# Patient Record
Sex: FEMALE | Race: WHITE | HISPANIC OR LATINO
[De-identification: names, ages, dates, MRNs, and addresses within clinical notes are randomized per-mention and may not be internally consistent; named-entity substitution may affect disease eponyms.]

---

## 2018-05-02 PROBLEM — Z00.00 ENCOUNTER FOR PREVENTIVE HEALTH EXAMINATION: Status: ACTIVE | Noted: 2018-05-02

## 2018-06-01 ENCOUNTER — APPOINTMENT (OUTPATIENT)
Dept: CARDIOLOGY | Facility: CLINIC | Age: 43
End: 2018-06-01

## 2018-06-01 VITALS
DIASTOLIC BLOOD PRESSURE: 70 MMHG | WEIGHT: 134 LBS | HEIGHT: 60 IN | SYSTOLIC BLOOD PRESSURE: 108 MMHG | BODY MASS INDEX: 26.31 KG/M2 | HEART RATE: 72 BPM

## 2018-06-01 DIAGNOSIS — Z82.49 FAMILY HISTORY OF ISCHEMIC HEART DISEASE AND OTHER DISEASES OF THE CIRCULATORY SYSTEM: ICD-10-CM

## 2018-06-01 RX ORDER — CLONAZEPAM 1 MG/1
1 TABLET ORAL
Refills: 0 | Status: ACTIVE | COMMUNITY

## 2018-06-29 ENCOUNTER — APPOINTMENT (OUTPATIENT)
Dept: CARDIOLOGY | Facility: CLINIC | Age: 43
End: 2018-06-29

## 2018-07-20 ENCOUNTER — APPOINTMENT (OUTPATIENT)
Dept: CARDIOLOGY | Facility: CLINIC | Age: 43
End: 2018-07-20

## 2018-07-20 VITALS
WEIGHT: 138 LBS | SYSTOLIC BLOOD PRESSURE: 128 MMHG | OXYGEN SATURATION: 99 % | HEIGHT: 60 IN | BODY MASS INDEX: 27.09 KG/M2 | DIASTOLIC BLOOD PRESSURE: 76 MMHG | HEART RATE: 71 BPM

## 2018-07-20 DIAGNOSIS — E78.5 HYPERLIPIDEMIA, UNSPECIFIED: ICD-10-CM

## 2018-07-20 DIAGNOSIS — R07.9 CHEST PAIN, UNSPECIFIED: ICD-10-CM

## 2018-07-25 ENCOUNTER — APPOINTMENT (OUTPATIENT)
Dept: OBGYN | Facility: CLINIC | Age: 43
End: 2018-07-25

## 2018-07-25 VITALS
HEIGHT: 59 IN | WEIGHT: 128 LBS | BODY MASS INDEX: 25.8 KG/M2 | SYSTOLIC BLOOD PRESSURE: 118 MMHG | DIASTOLIC BLOOD PRESSURE: 70 MMHG

## 2018-08-06 ENCOUNTER — OUTPATIENT (OUTPATIENT)
Dept: OUTPATIENT SERVICES | Facility: HOSPITAL | Age: 43
LOS: 1 days | Discharge: HOME | End: 2018-08-06

## 2018-08-06 DIAGNOSIS — E78.5 HYPERLIPIDEMIA, UNSPECIFIED: ICD-10-CM

## 2018-08-22 ENCOUNTER — APPOINTMENT (OUTPATIENT)
Dept: OBGYN | Facility: CLINIC | Age: 43
End: 2018-08-22

## 2018-09-12 ENCOUNTER — RECORD ABSTRACTING (OUTPATIENT)
Age: 43
End: 2018-09-12

## 2018-09-12 DIAGNOSIS — Z98.890 OTHER SPECIFIED POSTPROCEDURAL STATES: ICD-10-CM

## 2018-09-12 DIAGNOSIS — Z80.49 FAMILY HISTORY OF MALIGNANT NEOPLASM OF OTHER GENITAL ORGANS: ICD-10-CM

## 2018-09-12 DIAGNOSIS — Z78.9 OTHER SPECIFIED HEALTH STATUS: ICD-10-CM

## 2018-09-12 DIAGNOSIS — Z86.59 PERSONAL HISTORY OF OTHER MENTAL AND BEHAVIORAL DISORDERS: ICD-10-CM

## 2018-09-12 DIAGNOSIS — Z87.19 PERSONAL HISTORY OF OTHER DISEASES OF THE DIGESTIVE SYSTEM: ICD-10-CM

## 2018-09-17 ENCOUNTER — APPOINTMENT (OUTPATIENT)
Dept: OBGYN | Facility: CLINIC | Age: 43
End: 2018-09-17

## 2018-09-21 ENCOUNTER — APPOINTMENT (OUTPATIENT)
Dept: CARDIOLOGY | Facility: CLINIC | Age: 43
End: 2018-09-21

## 2019-01-29 ENCOUNTER — APPOINTMENT (OUTPATIENT)
Dept: OBGYN | Facility: CLINIC | Age: 44
End: 2019-01-29
Payer: MEDICAID

## 2019-01-29 PROCEDURE — 99396 PREV VISIT EST AGE 40-64: CPT

## 2019-02-26 ENCOUNTER — APPOINTMENT (OUTPATIENT)
Dept: OBGYN | Facility: CLINIC | Age: 44
End: 2019-02-26
Payer: MEDICAID

## 2019-02-26 PROCEDURE — 57454 BX/CURETT OF CERVIX W/SCOPE: CPT

## 2019-03-12 ENCOUNTER — APPOINTMENT (OUTPATIENT)
Dept: OBGYN | Facility: CLINIC | Age: 44
End: 2019-03-12
Payer: MEDICAID

## 2019-03-12 PROCEDURE — 99212 OFFICE O/P EST SF 10 MIN: CPT

## 2019-03-28 ENCOUNTER — APPOINTMENT (OUTPATIENT)
Dept: OBGYN | Facility: CLINIC | Age: 44
End: 2019-03-28
Payer: MEDICAID

## 2019-03-28 PROCEDURE — 99213 OFFICE O/P EST LOW 20 MIN: CPT

## 2019-05-14 ENCOUNTER — APPOINTMENT (OUTPATIENT)
Dept: OBGYN | Facility: CLINIC | Age: 44
End: 2019-05-14
Payer: MEDICAID

## 2019-05-14 PROCEDURE — 99213 OFFICE O/P EST LOW 20 MIN: CPT

## 2019-09-10 ENCOUNTER — APPOINTMENT (OUTPATIENT)
Dept: OBGYN | Facility: CLINIC | Age: 44
End: 2019-09-10
Payer: MEDICAID

## 2019-09-10 PROCEDURE — 99213 OFFICE O/P EST LOW 20 MIN: CPT

## 2019-09-10 PROCEDURE — 87490 CHLMYD TRACH DNA DIR PROBE: CPT

## 2019-09-11 ENCOUNTER — APPOINTMENT (OUTPATIENT)
Dept: OBGYN | Facility: CLINIC | Age: 44
End: 2019-09-11
Payer: MEDICAID

## 2019-09-11 PROCEDURE — 93975 VASCULAR STUDY: CPT

## 2019-09-11 PROCEDURE — 76830 TRANSVAGINAL US NON-OB: CPT

## 2019-11-19 ENCOUNTER — APPOINTMENT (OUTPATIENT)
Dept: OBGYN | Facility: CLINIC | Age: 44
End: 2019-11-19
Payer: MEDICAID

## 2019-11-19 PROCEDURE — 93975 VASCULAR STUDY: CPT

## 2019-11-19 PROCEDURE — 76830 TRANSVAGINAL US NON-OB: CPT

## 2020-03-24 ENCOUNTER — APPOINTMENT (OUTPATIENT)
Dept: OBGYN | Facility: CLINIC | Age: 45
End: 2020-03-24

## 2020-06-16 ENCOUNTER — APPOINTMENT (OUTPATIENT)
Dept: OBGYN | Facility: CLINIC | Age: 45
End: 2020-06-16
Payer: MEDICAID

## 2020-06-16 PROCEDURE — 87490 CHLMYD TRACH DNA DIR PROBE: CPT

## 2020-06-16 PROCEDURE — 99396 PREV VISIT EST AGE 40-64: CPT

## 2020-06-23 ENCOUNTER — APPOINTMENT (OUTPATIENT)
Dept: OBGYN | Facility: CLINIC | Age: 45
End: 2020-06-23
Payer: MEDICAID

## 2020-06-23 PROCEDURE — 76830 TRANSVAGINAL US NON-OB: CPT

## 2020-07-29 ENCOUNTER — APPOINTMENT (OUTPATIENT)
Dept: UROGYNECOLOGY | Facility: CLINIC | Age: 45
End: 2020-07-29

## 2020-08-11 ENCOUNTER — APPOINTMENT (OUTPATIENT)
Dept: OBGYN | Facility: CLINIC | Age: 45
End: 2020-08-11
Payer: MEDICAID

## 2020-08-11 PROCEDURE — 93975 VASCULAR STUDY: CPT

## 2020-08-11 PROCEDURE — 76830 TRANSVAGINAL US NON-OB: CPT

## 2021-01-12 ENCOUNTER — APPOINTMENT (OUTPATIENT)
Dept: OBGYN | Facility: CLINIC | Age: 46
End: 2021-01-12
Payer: MEDICAID

## 2021-01-12 PROCEDURE — 99072 ADDL SUPL MATRL&STAF TM PHE: CPT

## 2021-01-12 PROCEDURE — 99213 OFFICE O/P EST LOW 20 MIN: CPT

## 2021-01-19 ENCOUNTER — APPOINTMENT (OUTPATIENT)
Dept: OBGYN | Facility: CLINIC | Age: 46
End: 2021-01-19
Payer: MEDICAID

## 2021-01-19 PROCEDURE — 99072 ADDL SUPL MATRL&STAF TM PHE: CPT

## 2021-01-19 PROCEDURE — 99213 OFFICE O/P EST LOW 20 MIN: CPT

## 2021-02-02 ENCOUNTER — APPOINTMENT (OUTPATIENT)
Dept: OBGYN | Facility: CLINIC | Age: 46
End: 2021-02-02

## 2021-07-20 ENCOUNTER — NON-APPOINTMENT (OUTPATIENT)
Age: 46
End: 2021-07-20

## 2021-07-20 ENCOUNTER — APPOINTMENT (OUTPATIENT)
Dept: OBGYN | Facility: CLINIC | Age: 46
End: 2021-07-20
Payer: MEDICAID

## 2021-07-20 PROCEDURE — 87490 CHLMYD TRACH DNA DIR PROBE: CPT

## 2021-07-20 PROCEDURE — 99396 PREV VISIT EST AGE 40-64: CPT

## 2021-08-05 ENCOUNTER — APPOINTMENT (OUTPATIENT)
Dept: OBGYN | Facility: CLINIC | Age: 46
End: 2021-08-05
Payer: MEDICAID

## 2021-08-05 PROCEDURE — 99212 OFFICE O/P EST SF 10 MIN: CPT

## 2021-08-05 PROCEDURE — 76830 TRANSVAGINAL US NON-OB: CPT

## 2022-03-01 ENCOUNTER — APPOINTMENT (OUTPATIENT)
Dept: OBGYN | Facility: CLINIC | Age: 47
End: 2022-03-01
Payer: MEDICAID

## 2022-03-01 PROCEDURE — 99215 OFFICE O/P EST HI 40 MIN: CPT

## 2022-08-08 ENCOUNTER — APPOINTMENT (OUTPATIENT)
Dept: OBGYN | Facility: CLINIC | Age: 47
End: 2022-08-08

## 2022-08-08 ENCOUNTER — NON-APPOINTMENT (OUTPATIENT)
Age: 47
End: 2022-08-08

## 2022-08-08 PROCEDURE — 87490 CHLMYD TRACH DNA DIR PROBE: CPT

## 2022-08-08 PROCEDURE — 99396 PREV VISIT EST AGE 40-64: CPT

## 2022-08-09 ENCOUNTER — TRANSCRIPTION ENCOUNTER (OUTPATIENT)
Age: 47
End: 2022-08-09

## 2022-12-27 ENCOUNTER — APPOINTMENT (OUTPATIENT)
Dept: OBGYN | Facility: CLINIC | Age: 47
End: 2022-12-27

## 2022-12-27 DIAGNOSIS — N89.8 OTHER SPECIFIED NONINFLAMMATORY DISORDERS OF VAGINA: ICD-10-CM

## 2022-12-27 PROCEDURE — 99213 OFFICE O/P EST LOW 20 MIN: CPT

## 2022-12-27 RX ORDER — TERCONAZOLE 8 MG/G
0.8 CREAM VAGINAL
Qty: 20 | Refills: 1 | Status: ACTIVE | COMMUNITY
Start: 2022-12-27 | End: 1900-01-01

## 2022-12-27 NOTE — HISTORY OF PRESENT ILLNESS
[FreeTextEntry1] : -- 48 Y/O HERE C/O VAGINAL ITCHING;-[facility].\par PMHX;/ anxiety, depression, appendectomy\par SOCIAL HX;-ETOH -CIGG\par STD; HPV / DISCUSSED CONDOMS\par FAMILY HISTORY OF BREAST CANCER\par REVIEW OF SYMPTOMS DONE;\par ALLERGIES; pt answered NKDA\par Medication reconciliation was completed by reviewing, with the patient's\par involvement, the patient's current outpatient medications and those\par ordered for the patient today.[facility] depression & anxiety\par \par PE;\par ABDOMEN;SOFT NT ND\par NL GENITALIA \par VAGINA -DC THICK WHITE\par CERVIX;-CMT\par UTERUS;NL SIZE NT AV\par ADNEXA; NT - MASSES\par \par A;P;VAGINTIS\par -BD AFFIRM\par -TERAZOL 7\par -F-U PRN.\par \par -F-U PRN.

## 2023-01-03 RX ORDER — FLUCONAZOLE 150 MG/1
150 TABLET ORAL
Qty: 1 | Refills: 1 | Status: ACTIVE | COMMUNITY
Start: 2023-01-03 | End: 1900-01-01

## 2023-04-27 ENCOUNTER — APPOINTMENT (OUTPATIENT)
Dept: ORTHOPEDIC SURGERY | Facility: CLINIC | Age: 48
End: 2023-04-27
Payer: MEDICAID

## 2023-04-27 PROCEDURE — 72040 X-RAY EXAM NECK SPINE 2-3 VW: CPT

## 2023-04-27 PROCEDURE — 99213 OFFICE O/P EST LOW 20 MIN: CPT

## 2023-04-27 PROCEDURE — 73070 X-RAY EXAM OF ELBOW: CPT | Mod: RT

## 2023-04-27 NOTE — ASSESSMENT
[FreeTextEntry1] :  will take ibuprofen 1 a day heat light exercise stretching we deferred on therapy no reason for an MRI I will see her in a few months going to continue to work 0

## 2023-04-27 NOTE — HISTORY OF PRESENT ILLNESS
[de-identified] : History of Present Illness\par Ms. Wen Dumont, a 46-year-old female, presents today for evaluation pain to the lower back more to the right and\par left.\par Patient denies any trauma or any injury.\par Patient works as a home health aid.\par Patient does admits to doing exercise routinely, she does a lot of walking and Zoey. \par Patient describes the pain as achy and at times when she does certain movements she has sharp acute pain.\par Patient denies any radiation of pain to the lower extremities.

## 2023-04-27 NOTE — REASON FOR VISIT
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

## 2023-04-27 NOTE — IMAGING
[de-identified] :  pleasant easy to examine in no distress neck mild limits in motion with spasm no pain on compression some trapezius spasm both shoulders full motion of the weakness right upper arm\par \par Right elbow tenderness laterally worse with resisted supination extension at the wrist\par \par Cervical x-ray mild C5-6 changes\par \par Right elbow x-ray unremarkable

## 2023-05-08 ENCOUNTER — APPOINTMENT (OUTPATIENT)
Dept: OBGYN | Facility: CLINIC | Age: 48
End: 2023-05-08
Payer: MEDICAID

## 2023-05-08 PROCEDURE — 76830 TRANSVAGINAL US NON-OB: CPT

## 2023-05-08 PROCEDURE — 99213 OFFICE O/P EST LOW 20 MIN: CPT | Mod: 25

## 2023-05-08 PROCEDURE — 93976 VASCULAR STUDY: CPT

## 2023-05-08 NOTE — HISTORY OF PRESENT ILLNESS
[FreeTextEntry1] : -- 46 Y/O LMP 12/2022 AND THEN 4/17/23 AND STILL SPOTTING AND THEN STARTED TO BLEED HEAVIER.\par PMHX;/ anxiety, depression, appendectomy\par SOCIAL HX;-ETOH -CIGG\par STD; HPV / DISCUSSED CONDOMS\par FAMILY HISTORY OF BREAST CANCER\par REVIEW OF SYMPTOMS DONE;\par ALLERGIES; pt answered NKDA\par Medication reconciliation was completed by reviewing, with the patient's\par involvement, the patient's current outpatient medications and those\par ordered for the patient today.[facility] depression & anxiety\par \par PE;\par ABDOMEN;SOFT NT ND\par EXT -CCE\par \par A;P;IRREG BLEEDING\par -SONO REVIEWED\par -HORMONAL PANEL\par -HYSTEROSCOPY APPT\par -F-U AFTER ABOVE.\par \par

## 2023-05-09 ENCOUNTER — APPOINTMENT (OUTPATIENT)
Dept: OBGYN | Facility: CLINIC | Age: 48
End: 2023-05-09
Payer: MEDICAID

## 2023-05-09 LAB
ESTRADIOL SERPL-MCNC: 7 PG/ML
FSH SERPL-MCNC: 20.5 IU/L
HCG SERPL-MCNC: <1 MIU/ML
LH SERPL-ACNC: 12.9 IU/L
PROGEST SERPL-MCNC: 0.1 NG/ML
PROLACTIN SERPL-MCNC: 8.6 NG/ML
TESTOST SERPL-MCNC: <2.5 NG/DL
TSH SERPL-ACNC: 1.55 UIU/ML

## 2023-05-09 PROCEDURE — 58558Z: CUSTOM

## 2023-05-09 PROCEDURE — 99213 OFFICE O/P EST LOW 20 MIN: CPT | Mod: 25

## 2023-05-09 NOTE — HISTORY OF PRESENT ILLNESS
[FreeTextEntry1] : -- 48 Y/O LMP 12/2022 AND THEN 4/17/23 AND STILL SPOTTING AND THEN STARTED TO BLEED HEAVIER.PT C/O IRREG VB.PT HERE FOR HYSTEROSCOPY;INFORMED CONSENT OBTAINED RBA DISCUSSED.\par PMHX;/ anxiety, depression, appendectomy\par SOCIAL HX;-ETOH -CIGG\par STD; HPV / DISCUSSED CONDOMS\par FAMILY HISTORY OF BREAST CANCER\par REVIEW OF SYMPTOMS DONE;\par ALLERGIES; pt answered NKDA\par Medication reconciliation was completed by reviewing, with the patient's\par involvement, the patient's current outpatient medications and those\par ordered for the patient today.[facility] depression & anxiety\par \par PE;\par ABDOMEN;SOFT NT ND\par NL GENITALIA\par VAGINA-DC\par MIN BLOOD\par CX-CMT\par UTERUS NL SIZE NT\par ADNEXA NT - MASSES\par EXT -CCE\par \par -TIME OUT DONE.  EBL LESS THAN 5CC\par -IN OFFICE HYSTEROSCOPY DONE WITHOUT INCIDENT; CX DIALTED ,UTERUS SOUNDED TO 7,\par -BOTH OSTIA SEEN; NL APPERING ENDOMETRIAL CAVITY;- MASSES - POLYPS\par -ENDO BX DONE WITHOUT INCIDENT\par -I/O MONITORED THROUGHOUT THE PROCEDURE\par -PT TOLERATED THE PROCEDURE WELL\par -INSTRUCTIONS REVIEWED\par -RTC 2 WEEKS.\par \par \par

## 2023-05-11 LAB — CORE LAB BIOPSY: NORMAL

## 2023-05-22 ENCOUNTER — APPOINTMENT (OUTPATIENT)
Dept: OBGYN | Facility: CLINIC | Age: 48
End: 2023-05-22
Payer: MEDICARE

## 2023-05-22 DIAGNOSIS — N84.0 POLYP OF CORPUS UTERI: ICD-10-CM

## 2023-05-22 DIAGNOSIS — N92.6 IRREGULAR MENSTRUATION, UNSPECIFIED: ICD-10-CM

## 2023-05-22 PROCEDURE — 99213 OFFICE O/P EST LOW 20 MIN: CPT

## 2023-05-22 NOTE — HISTORY OF PRESENT ILLNESS
[FreeTextEntry1] : -- 48 Y/O LMP 12/2022 AND THEN 4/17/23 HERE FOR F-U AFTER HYSTEROSCOPY;PATH REVIEWED;POLYP;PT C/O IRREG BLEEDING.\par PMHX;/ anxiety, depression, appendectomy\par SOCIAL HX;-ETOH -CIGG\par STD; HPV / DISCUSSED CONDOMS\par FAMILY HISTORY OF BREAST CANCER\par REVIEW OF SYMPTOMS DONE;\par ALLERGIES; pt answered NKDA\par Medication reconciliation was completed by reviewing, with the patient's\par involvement, the patient's current outpatient medications and those\par ordered for the patient today.[facility] depression & anxiety\par \par PE;\par ABDOMEN;SOFT NT ND\par EXT -CCE\par \par A;P;IRREG VB S/P HYSTEROSCOPY;POLYP\par -PATH REVIEWED\par -ANSWERED QUESTIONS\par -DISCUSSED POSSIBLE POLYP REMOVAL IN OPERTAING ROOM\par -ANSWERED QUESTIONS.

## 2023-05-30 ENCOUNTER — RX RENEWAL (OUTPATIENT)
Age: 48
End: 2023-05-30

## 2023-06-01 ENCOUNTER — APPOINTMENT (OUTPATIENT)
Dept: ORTHOPEDIC SURGERY | Facility: CLINIC | Age: 48
End: 2023-06-01
Payer: MEDICAID

## 2023-06-01 PROCEDURE — 99213 OFFICE O/P EST LOW 20 MIN: CPT

## 2023-06-01 NOTE — REASON FOR VISIT
[FreeTextEntry2] : back and neck pain Pain is almost gone as is the pain in her elbow stop taking the ibuprofen exercising lost a couple lb is working full duty

## 2023-06-01 NOTE — IMAGING
[de-identified] :  pleasant easy to examine in no distress neck mild limits in motion with spasm no pain on compression some trapezius spasm both shoulders full motion of the weakness right upper arm\par \par Right elbow tenderness laterally worse with resisted supination extension at the wrist\par \par Cervical x-ray mild C5-6 changes\par \par Right elbow x-ray unremarkable

## 2023-06-01 NOTE — HISTORY OF PRESENT ILLNESS
[de-identified] : History of Present Illness\par Ms. Wen Dumont, a 46-year-old female, presents today for evaluation pain to the lower back more to the right and\par left.\par Patient denies any trauma or any injury.\par Patient works as a home health aid.\par Patient does admits to doing exercise routinely, she does a lot of walking and Zoey. \par Patient describes the pain as achy and at times when she does certain movements she has sharp acute pain.\par Patient denies any radiation of pain to the lower extremities.

## 2023-06-01 NOTE — ASSESSMENT
[FreeTextEntry1] :   ibuprofen was refilled heat light exercise stretching we deferred on therapy no reason for an MRI I will see  p.r.n. going to continue to work

## 2023-07-30 ENCOUNTER — RX RENEWAL (OUTPATIENT)
Age: 48
End: 2023-07-30

## 2023-11-30 ENCOUNTER — APPOINTMENT (OUTPATIENT)
Dept: ORTHOPEDIC SURGERY | Facility: CLINIC | Age: 48
End: 2023-11-30
Payer: MEDICAID

## 2023-11-30 PROCEDURE — 99213 OFFICE O/P EST LOW 20 MIN: CPT

## 2023-12-27 RX ORDER — TERCONAZOLE 8 MG/G
0.8 CREAM VAGINAL
Qty: 1 | Refills: 1 | Status: ACTIVE | COMMUNITY
Start: 2023-12-27 | End: 1900-01-01

## 2024-01-18 ENCOUNTER — APPOINTMENT (OUTPATIENT)
Dept: OBGYN | Facility: CLINIC | Age: 49
End: 2024-01-18

## 2024-05-30 ENCOUNTER — APPOINTMENT (OUTPATIENT)
Dept: ORTHOPEDIC SURGERY | Facility: CLINIC | Age: 49
End: 2024-05-30

## 2024-06-19 ENCOUNTER — APPOINTMENT (OUTPATIENT)
Dept: ORTHOPEDIC SURGERY | Facility: CLINIC | Age: 49
End: 2024-06-19
Payer: MEDICAID

## 2024-06-19 DIAGNOSIS — M54.12 RADICULOPATHY, CERVICAL REGION: ICD-10-CM

## 2024-06-19 DIAGNOSIS — M50.90 CERVICAL DISC DISORDER, UNSPECIFIED, UNSPECIFIED CERVICAL REGION: ICD-10-CM

## 2024-06-19 DIAGNOSIS — M77.11 LATERAL EPICONDYLITIS, RIGHT ELBOW: ICD-10-CM

## 2024-06-19 PROCEDURE — 99213 OFFICE O/P EST LOW 20 MIN: CPT

## 2024-06-19 RX ORDER — IBUPROFEN 600 MG/1
600 TABLET, FILM COATED ORAL
Qty: 60 | Refills: 0 | Status: ACTIVE | COMMUNITY
Start: 2023-04-27 | End: 1900-01-01

## 2024-06-19 NOTE — ASSESSMENT
[FreeTextEntry1] : Renewed ibuprofen activities as tolerated no intervention or injection at this time return in 6 months as needed

## 2024-06-19 NOTE — IMAGING
[de-identified] :  pleasant easy to examine in no distress neck mild limits in motion with spasm no pain on compression some trapezius spasm both shoulders full motion of the weakness right upper arm\par  \par  Right elbow tenderness laterally worse with resisted supination extension at the wrist\par  \par  Cervical x-ray mild C5-6 changes\par  \par  Right elbow x-ray unremarkable

## 2024-06-19 NOTE — HISTORY OF PRESENT ILLNESS
[de-identified] : History of Present Illness\par  Ms. Wen Dumont, a 46-year-old female, presents today for evaluation pain to the lower back more to the right and\par  left.\par  Patient denies any trauma or any injury.\par  Patient works as a home health aid.\par  Patient does admits to doing exercise routinely, she does a lot of walking and Zoey. \par  Patient describes the pain as achy and at times when she does certain movements she has sharp acute pain.\par  Patient denies any radiation of pain to the lower extremities.

## 2024-08-22 ENCOUNTER — RX RENEWAL (OUTPATIENT)
Age: 49
End: 2024-08-22

## 2024-09-09 ENCOUNTER — APPOINTMENT (OUTPATIENT)
Dept: OBGYN | Facility: CLINIC | Age: 49
End: 2024-09-09

## 2024-09-19 ENCOUNTER — APPOINTMENT (OUTPATIENT)
Dept: OBGYN | Facility: CLINIC | Age: 49
End: 2024-09-19
Payer: MEDICAID

## 2024-09-19 DIAGNOSIS — N92.6 IRREGULAR MENSTRUATION, UNSPECIFIED: ICD-10-CM

## 2024-09-19 PROCEDURE — 99213 OFFICE O/P EST LOW 20 MIN: CPT

## 2024-09-19 RX ORDER — METRONIDAZOLE 500 MG/1
500 TABLET ORAL
Qty: 14 | Refills: 0 | Status: ACTIVE | COMMUNITY
Start: 2024-09-19 | End: 1900-01-01

## 2024-09-19 NOTE — HISTORY OF PRESENT ILLNESS
[FreeTextEntry1] : -- 47 Y/O LMP 9/7/24 AND ALSO HAD BLEEDING 8/20/24;.PT HAD BX 5/2023;POLYP. PMHX;/ anxiety, depression, appendectomy SOCIAL HX;-ETOH -CIGG STD; HPV / DISCUSSED CONDOMS FAMILY HISTORY OF BREAST CANCER REVIEW OF SYMPTOMS DONE; ALLERGIES; pt answered NKDA Medication reconciliation was completed by reviewing, with the patient's involvement, the patient's current outpatient medications and those ordered for the patient today.[facility] depression & anxiety  PE; ABDOMEN;SOFT NT ND EXT -CCE  A;P;IRREG VB  -HORMONAL PANEL -SONO -F-U AFTER ABOVE.

## 2024-09-20 LAB
ESTRADIOL SERPL-MCNC: 22 PG/ML
FSH SERPL-MCNC: 28.9 IU/L
HCG SERPL-MCNC: <1 MIU/ML
LH SERPL-ACNC: 19.3 IU/L
PROGEST SERPL-MCNC: 0.46 NG/ML
PROLACTIN SERPL-MCNC: 8.7 NG/ML
TESTOST SERPL-MCNC: 5.8 NG/DL
TSH SERPL-ACNC: 1.57 UIU/ML

## 2024-09-28 ENCOUNTER — RX RENEWAL (OUTPATIENT)
Age: 49
End: 2024-09-28

## 2024-10-10 ENCOUNTER — APPOINTMENT (OUTPATIENT)
Dept: OBGYN | Facility: CLINIC | Age: 49
End: 2024-10-10
Payer: MEDICAID

## 2024-10-10 DIAGNOSIS — N89.8 OTHER SPECIFIED NONINFLAMMATORY DISORDERS OF VAGINA: ICD-10-CM

## 2024-10-10 DIAGNOSIS — N92.6 IRREGULAR MENSTRUATION, UNSPECIFIED: ICD-10-CM

## 2024-10-10 DIAGNOSIS — Z01.419 ENCOUNTER FOR GYNECOLOGICAL EXAMINATION (GENERAL) (ROUTINE) W/OUT ABNORMAL FINDINGS: ICD-10-CM

## 2024-10-10 PROCEDURE — 99396 PREV VISIT EST AGE 40-64: CPT

## 2024-10-10 PROCEDURE — 99213 OFFICE O/P EST LOW 20 MIN: CPT | Mod: 25

## 2024-10-13 LAB
BV BACTERIA RRNA VAG QL NAA+PROBE: NOT DETECTED
C GLABRATA RNA VAG QL NAA+PROBE: NOT DETECTED
C TRACH RRNA SPEC QL NAA+PROBE: NOT DETECTED
CANDIDA RRNA VAG QL PROBE: NOT DETECTED
N GONORRHOEA RRNA SPEC QL NAA+PROBE: NOT DETECTED
SOURCE TP AMPLIFICATION: NORMAL
T VAGINALIS RRNA SPEC QL NAA+PROBE: NOT DETECTED

## 2024-10-14 LAB — HPV HIGH+LOW RISK DNA PNL CVX: NOT DETECTED

## 2024-10-20 LAB — CYTOLOGY CVX/VAG DOC THIN PREP: NORMAL

## 2024-11-06 ENCOUNTER — RX RENEWAL (OUTPATIENT)
Age: 49
End: 2024-11-06

## 2025-01-15 ENCOUNTER — APPOINTMENT (OUTPATIENT)
Dept: ORTHOPEDIC SURGERY | Facility: CLINIC | Age: 50
End: 2025-01-15
Payer: MEDICAID

## 2025-01-15 DIAGNOSIS — M77.11 LATERAL EPICONDYLITIS, RIGHT ELBOW: ICD-10-CM

## 2025-01-15 DIAGNOSIS — M54.12 RADICULOPATHY, CERVICAL REGION: ICD-10-CM

## 2025-01-15 DIAGNOSIS — M50.90 CERVICAL DISC DISORDER, UNSPECIFIED, UNSPECIFIED CERVICAL REGION: ICD-10-CM

## 2025-01-15 PROCEDURE — 99213 OFFICE O/P EST LOW 20 MIN: CPT

## 2025-02-24 ENCOUNTER — RX RENEWAL (OUTPATIENT)
Age: 50
End: 2025-02-24

## 2025-05-01 ENCOUNTER — APPOINTMENT (OUTPATIENT)
Dept: OBGYN | Facility: CLINIC | Age: 50
End: 2025-05-01

## 2025-05-01 DIAGNOSIS — N89.8 OTHER SPECIFIED NONINFLAMMATORY DISORDERS OF VAGINA: ICD-10-CM

## 2025-05-01 PROCEDURE — 99214 OFFICE O/P EST MOD 30 MIN: CPT

## 2025-05-01 PROCEDURE — 99459 PELVIC EXAMINATION: CPT

## 2025-05-01 RX ORDER — TERCONAZOLE 8 MG/G
0.8 CREAM VAGINAL
Qty: 20 | Refills: 2 | Status: ACTIVE | COMMUNITY
Start: 2025-05-01 | End: 1900-01-01

## 2025-05-05 LAB
BV BACTERIA RRNA VAG QL NAA+PROBE: NOT DETECTED
C GLABRATA RNA VAG QL NAA+PROBE: NOT DETECTED
C TRACH RRNA SPEC QL NAA+PROBE: NOT DETECTED
CANDIDA RRNA VAG QL PROBE: NOT DETECTED
N GONORRHOEA RRNA SPEC QL NAA+PROBE: NOT DETECTED
T VAGINALIS RRNA SPEC QL NAA+PROBE: NOT DETECTED

## 2025-07-08 ENCOUNTER — APPOINTMENT (OUTPATIENT)
Dept: ORTHOPEDIC SURGERY | Facility: CLINIC | Age: 50
End: 2025-07-08